# Patient Record
Sex: MALE | Race: WHITE | NOT HISPANIC OR LATINO
[De-identification: names, ages, dates, MRNs, and addresses within clinical notes are randomized per-mention and may not be internally consistent; named-entity substitution may affect disease eponyms.]

---

## 2020-05-28 ENCOUNTER — RECORDS - HEALTHEAST (OUTPATIENT)
Dept: ADMINISTRATIVE | Facility: OTHER | Age: 60
End: 2020-05-28

## 2023-02-01 ENCOUNTER — LAB REQUISITION (OUTPATIENT)
Dept: LAB | Facility: HOSPITAL | Age: 63
End: 2023-02-01

## 2023-02-01 PROCEDURE — 88342 IMHCHEM/IMCYTCHM 1ST ANTB: CPT | Mod: TC | Performed by: PATHOLOGY

## 2025-06-16 ENCOUNTER — TRANSCRIBE ORDERS (OUTPATIENT)
Dept: OTHER | Age: 65
End: 2025-06-16

## 2025-06-16 DIAGNOSIS — M54.17 LUMBOSACRAL RADICULOPATHY: Primary | ICD-10-CM

## 2025-06-25 NOTE — TELEPHONE ENCOUNTER
Action    Action Taken Request sent to [facility]ul for imaging      SPINE PATIENTS - NEW PROTOCOL PREVISIT     RECORDS RECEVEIVED FROM: Referred by Marcella Jacobson; direct referral   REASON FOR VISIT: Lumbosacral radiculopathy; Nerve root compression in the setting of bony mets   PROVIDER: Noah   DATE OF APPT: 07/11/2025     NOTES (FOR ALL VISITS) STATUS DETAILS   OFFICE NOTE from referring provider  Referral in chart, notes in via care everywhere   OFFICE NOTE from other specialist     DISCHARGE SUMMARY from hospital Care everywhere 06/13/2025 ED visit   DISCHARGE REPORT from ER     OPERATIVE REPORT     EMG REPORT     Injection     Physical therapy       IMAGING  (FOR ALL VISITS) STATUS DETAILS   MRI (HEAD, NECK, SPINE) received MRI lumbar 06/12/2025 w/[facility]   XRAY (SPINE) *NEUROSURGERY*     CT (HEAD, NECK, SPINE) received CT abd pelvis 02/06/2025        Does patient have C2C?  Year last updated Action     YES   []      Please update at appointment if outdated more than 5 years       NO     [x]   N/A   Please complete C2C at appointment

## 2025-07-11 ENCOUNTER — PRE VISIT (OUTPATIENT)
Dept: NEUROSURGERY | Facility: CLINIC | Age: 65
End: 2025-07-11

## 2025-07-11 ENCOUNTER — OFFICE VISIT (OUTPATIENT)
Dept: NEUROSURGERY | Facility: CLINIC | Age: 65
End: 2025-07-11
Payer: COMMERCIAL

## 2025-07-11 VITALS
HEIGHT: 68 IN | SYSTOLIC BLOOD PRESSURE: 101 MMHG | OXYGEN SATURATION: 97 % | BODY MASS INDEX: 34.1 KG/M2 | WEIGHT: 225 LBS | HEART RATE: 72 BPM | DIASTOLIC BLOOD PRESSURE: 66 MMHG

## 2025-07-11 DIAGNOSIS — C79.51 METASTASIS TO SPINAL COLUMN (H): Primary | ICD-10-CM

## 2025-07-11 DIAGNOSIS — M54.17 LUMBOSACRAL RADICULOPATHY: ICD-10-CM

## 2025-07-11 DIAGNOSIS — C61 PROSTATE CANCER METASTATIC TO MULTIPLE SITES (H): ICD-10-CM

## 2025-07-11 PROCEDURE — 1125F AMNT PAIN NOTED PAIN PRSNT: CPT | Performed by: NEUROLOGICAL SURGERY

## 2025-07-11 PROCEDURE — 3078F DIAST BP <80 MM HG: CPT | Performed by: NEUROLOGICAL SURGERY

## 2025-07-11 PROCEDURE — 3074F SYST BP LT 130 MM HG: CPT | Performed by: NEUROLOGICAL SURGERY

## 2025-07-11 PROCEDURE — 99204 OFFICE O/P NEW MOD 45 MIN: CPT | Performed by: NEUROLOGICAL SURGERY

## 2025-07-11 RX ORDER — ABIRATERONE ACETATE 250 MG/1
1000 TABLET ORAL
COMMUNITY
Start: 2025-02-14

## 2025-07-11 RX ORDER — METOPROLOL SUCCINATE 100 MG/1
12.5 TABLET, EXTENDED RELEASE ORAL DAILY
COMMUNITY

## 2025-07-11 RX ORDER — METOPROLOL SUCCINATE 25 MG/1
12.5 TABLET, EXTENDED RELEASE ORAL
COMMUNITY
Start: 2025-07-09

## 2025-07-11 RX ORDER — SEMAGLUTIDE 0.68 MG/ML
INJECTION, SOLUTION SUBCUTANEOUS
COMMUNITY
Start: 2025-06-23

## 2025-07-11 RX ORDER — ATORVASTATIN CALCIUM 10 MG/1
TABLET, FILM COATED ORAL DAILY
COMMUNITY

## 2025-07-11 RX ORDER — INSULIN LISPRO 100 [IU]/ML
INJECTION, SOLUTION INTRAVENOUS; SUBCUTANEOUS
COMMUNITY
Start: 2025-02-19

## 2025-07-11 RX ORDER — TICAGRELOR 90 MG/1
90 TABLET, FILM COATED ORAL
COMMUNITY
Start: 2025-07-09

## 2025-07-11 RX ORDER — AMPICILLIN TRIHYDRATE 500 MG
1000 CAPSULE ORAL
COMMUNITY
Start: 2023-12-18

## 2025-07-11 RX ORDER — ONDANSETRON 8 MG/1
8 TABLET, FILM COATED ORAL
COMMUNITY
Start: 2025-02-18

## 2025-07-11 RX ORDER — ESCITALOPRAM OXALATE 10 MG/1
10 TABLET ORAL DAILY
COMMUNITY
Start: 2025-05-28

## 2025-07-11 RX ORDER — ASPIRIN 81 MG/1
81 TABLET, CHEWABLE ORAL
COMMUNITY

## 2025-07-11 RX ORDER — CYCLOBENZAPRINE HCL 10 MG
10 TABLET ORAL
COMMUNITY
Start: 2025-06-03

## 2025-07-11 RX ORDER — CLOPIDOGREL BISULFATE 75 MG/1
75 TABLET ORAL DAILY
COMMUNITY

## 2025-07-11 RX ORDER — LOSARTAN POTASSIUM 25 MG/1
12.5 TABLET ORAL DAILY
COMMUNITY
Start: 2025-07-09

## 2025-07-11 RX ORDER — ROSUVASTATIN CALCIUM 5 MG/1
5 TABLET, COATED ORAL DAILY
COMMUNITY
Start: 2025-02-03

## 2025-07-11 RX ORDER — PREDNISONE 5 MG/1
5 TABLET ORAL
COMMUNITY
Start: 2025-05-21

## 2025-07-11 ASSESSMENT — PAIN SCALES - GENERAL: PAINLEVEL_OUTOF10: MODERATE PAIN (6)

## 2025-07-11 NOTE — NURSING NOTE
"Sumanth Lang is a 64 year old male who presents for:  Chief Complaint   Patient presents with    Consult     Current pain 5/10  Pain range 5-9/10        Initial Vitals:  /66 (BP Location: Left arm, Patient Position: Sitting, Cuff Size: Adult Regular)   Pulse 72   Ht 5' 8\" (1.727 m)   Wt 225 lb (102.1 kg)   SpO2 97%   BMI 34.21 kg/m   Estimated body mass index is 34.21 kg/m  as calculated from the following:    Height as of this encounter: 5' 8\" (1.727 m).    Weight as of this encounter: 225 lb (102.1 kg).. Body surface area is 2.21 meters squared. BP completed using cuff size: regular  Moderate Pain (6)    Alejo Gardner    "

## 2025-07-11 NOTE — LETTER
7/11/2025      Sumanth Lang  [address] [place] [id number]      Dear Colleague,    Thank you for referring your patient, Sumanth Lang, to the [facility]. Please see a copy of my visit note below.    It was a pleasure to see Sumanth Lang today in Neurosurgery Clinic. He is a 64 year old male who is here for evaluation of his left L4 radiculopathy.    The patient has a history of castrate resistant prostate cancer and is currently on oral chemotherapy for his prostate cancer.  Beginning in June he noticed worsening pain in the back and left lower extremity radiating down the leg and what sounds like an L4 distribution with numbness and pain to the thigh and shin and front of the ankle.    He describes mild weakness but is able to walk and go up and down the stairs without difficulty.    He is retired.  He lives in [facility].   He is here today with his wife and daughter.    No past medical history on file.  No past surgical history on file.   Not on File    Current Outpatient Medications:      abiraterone (ZYTIGA) 250 MG tablet, Take 1,000 mg by mouth., Disp: , Rfl:      Cholecalciferol (D 1000) 25 MCG (1000 UT) CAPS, Take 1,000 Units by mouth., Disp: , Rfl:      cyclobenzaprine (FLEXERIL) 10 MG tablet, Take 10 mg by mouth., Disp: , Rfl:      empagliflozin (JARDIANCE) 25 MG TABS tablet, Take 25 mg by mouth daily., Disp: , Rfl:      escitalopram (LEXAPRO) 10 MG tablet, Take 10 mg by mouth daily., Disp: , Rfl:      insulin lispro (HUMALOG KWIKPEN) 100 UNIT/ML (1 unit dial) KWIKPEN, Inject per sliding scale 3 times daily at breakfast, lunch and dinner on days taking steroids. (Glucose 130-139 inject 2 units; 140-149 inject 4 units; 150-164 inject 6 units; 165-174 inject 8 units; 175-199 inject 10 units, > 200 inject 12 units), Disp: , Rfl:      losartan (COZAAR) 25 MG tablet, Take 12.5 mg by mouth daily., Disp: , Rfl:      metoprolol succinate ER (TOPROL XL) 25 MG 24 hr  tablet, Take 12.5 mg by mouth., Disp: , Rfl:      olaparib (LYNPARZA) 150 MG tablet, Take 300 mg by mouth 2 times daily, Disp: , Rfl:      ondansetron (ZOFRAN) 8 MG tablet, Take 8 mg by mouth., Disp: , Rfl:      predniSONE (DELTASONE) 5 MG tablet, Take 5 mg by mouth., Disp: , Rfl:      rosuvastatin (CRESTOR) 5 MG tablet, Take 5 mg by mouth daily., Disp: , Rfl:      semaglutide (OZEMPIC, 0.25 OR 0.5 MG/DOSE,) 2 MG/3ML pen, INJECT 0.75ML (0.5 MG) SUBCUTANEOUS ONCE WEEKLY, Disp: , Rfl:      ticagrelor (BRILINTA) 90 MG tablet, Take 90 mg by mouth., Disp: , Rfl:      aspirin (ASA) 81 MG chewable tablet, Take 81 mg by mouth., Disp: , Rfl:      atorvastatin (LIPITOR) 10 MG tablet, Take by mouth daily., Disp: , Rfl:      clopidogrel (PLAVIX) 75 MG tablet, Take 75 mg by mouth daily., Disp: , Rfl:      metoprolol succinate ER (TOPROL XL) 100 MG 24 hr tablet, Take 12.5 mg by mouth daily., Disp: , Rfl:   Social History     Socioeconomic History     Marital status:      Spouse name: None     Number of children: None     Years of education: None     Highest education level: None   Tobacco Use     Smoking status: Former     Types: Cigarettes     Smokeless tobacco: Never     Social Drivers of Health     Financial Resource Strain: Low Risk  (6/9/2025)    Received from [facility]    Financial Resource Strain      Difficulty of Paying Living Expenses: 3   Food Insecurity: No Food Insecurity (6/9/2025)    Received from [facility]    Food Insecurity      Do you worry your food will run out before you are able to buy more?: 1   Transportation Needs: No Transportation Needs (6/9/2025)    Received from [facility]    Transportation Needs      Does lack of transportation keep you from medical appointments?: 1      Does lack of transportation keep you from work, meetings or getting things that you need?: 1   Social Connections:  "Socially Integrated (6/9/2025)    Received from [facility]    Social Connections      Do you often feel lonely or isolated from those around you?: 0   Housing Stability: Low Risk  (6/9/2025)    Received from [facility]    Housing Stability      What is your housing situation today?: 1      No data available             ROS: 10 point ROS neg other than the symptoms noted above in the HPI.    Vitals:    07/11/25 1539   BP: 101/66   BP Location: Left arm   Patient Position: Sitting   Cuff Size: Adult Regular   Pulse: 72   SpO2: 97%   Weight: 102.1 kg (225 lb)   Height: 1.727 m (5' 8\")     Estimated body mass index is 34.21 kg/m  as calculated from the following:    Height as of this encounter: 1.727 m (5' 8\").    Weight as of this encounter: 102.1 kg (225 lb).  Moderate Pain (6)    Oswestry (JAVI) Questionnaire         No data to display                Visual Analog Scale (VAS) Questionnaire         No data to display                   Awake and alert  Bilateral lower extremity strength is 5 out of 5 in all muscle groups  Reflexes symmetric and normal except for 0 left patella  Decrease sensation to pinprick in the left L4 distribution  Posture erect without obvious deformity  Mild tenderness to palpation in the left paraspinal region.    Imaging: Review of his recent MRI shows bony involvement of L4 with epidural extension impinging on the left L4 nerve root.  Review of a bone scan from February shows new activity in L4 by report when compared with previous bone scans.    Assessment: L4 metastasis from metastatic prostate cancer with radiculopathy.    Plan: Given the active disease in this area and his symptoms, I think the patient would actually benefit most from radiotherapy to this area.  This would provide some degree of disease control at L4 as well as likely improve his symptomatology.  I have placed a referral to his local cancer center for " radiation oncology evaluation.  We also discussed that he may likely need a MRI with contrast for further evaluation prior to treatment but we will defer this to his primary oncologic providers in [facility].  He may follow-up with me on an as-needed basis.       Again, thank you for allowing me to participate in the care of your patient.        Sincerely,        Evan Zamora MD    Electronically signed

## 2025-07-11 NOTE — PATIENT INSTRUCTIONS
Patient Next Steps:    Radiation Oncology referral placed and faxed to [facility]  [facility] [phone]    Please call us if you have any further questions or concerns.    [facility]   Phone: [phone]  Fax: [phone]

## 2025-07-11 NOTE — PROGRESS NOTES
It was a pleasure to see Sumanth Lang today in Neurosurgery Clinic. He is a 64 year old male who is here for evaluation of his left L4 radiculopathy.    The patient has a history of castrate resistant prostate cancer and is currently on oral chemotherapy for his prostate cancer.  Beginning in June he noticed worsening pain in the back and left lower extremity radiating down the leg and what sounds like an L4 distribution with numbness and pain to the thigh and shin and front of the ankle.    He describes mild weakness but is able to walk and go up and down the stairs without difficulty.    He is retired.  He lives in [facility].   He is here today with his wife and daughter.    No past medical history on file.  No past surgical history on file.   Not on File    Current Outpatient Medications:     abiraterone (ZYTIGA) 250 MG tablet, Take 1,000 mg by mouth., Disp: , Rfl:     Cholecalciferol (D 1000) 25 MCG (1000 UT) CAPS, Take 1,000 Units by mouth., Disp: , Rfl:     cyclobenzaprine (FLEXERIL) 10 MG tablet, Take 10 mg by mouth., Disp: , Rfl:     empagliflozin (JARDIANCE) 25 MG TABS tablet, Take 25 mg by mouth daily., Disp: , Rfl:     escitalopram (LEXAPRO) 10 MG tablet, Take 10 mg by mouth daily., Disp: , Rfl:     insulin lispro (HUMALOG KWIKPEN) 100 UNIT/ML (1 unit dial) KWIKPEN, Inject per sliding scale 3 times daily at breakfast, lunch and dinner on days taking steroids. (Glucose 130-139 inject 2 units; 140-149 inject 4 units; 150-164 inject 6 units; 165-174 inject 8 units; 175-199 inject 10 units, > 200 inject 12 units), Disp: , Rfl:     losartan (COZAAR) 25 MG tablet, Take 12.5 mg by mouth daily., Disp: , Rfl:     metoprolol succinate ER (TOPROL XL) 25 MG 24 hr tablet, Take 12.5 mg by mouth., Disp: , Rfl:     olaparib (LYNPARZA) 150 MG tablet, Take 300 mg by mouth 2 times daily, Disp: , Rfl:     ondansetron (ZOFRAN) 8 MG tablet, Take 8 mg by mouth., Disp: , Rfl:     predniSONE (DELTASONE) 5 MG tablet, Take  5 mg by mouth., Disp: , Rfl:     rosuvastatin (CRESTOR) 5 MG tablet, Take 5 mg by mouth daily., Disp: , Rfl:     semaglutide (OZEMPIC, 0.25 OR 0.5 MG/DOSE,) 2 MG/3ML pen, INJECT 0.75ML (0.5 MG) SUBCUTANEOUS ONCE WEEKLY, Disp: , Rfl:     ticagrelor (BRILINTA) 90 MG tablet, Take 90 mg by mouth., Disp: , Rfl:     aspirin (ASA) 81 MG chewable tablet, Take 81 mg by mouth., Disp: , Rfl:     atorvastatin (LIPITOR) 10 MG tablet, Take by mouth daily., Disp: , Rfl:     clopidogrel (PLAVIX) 75 MG tablet, Take 75 mg by mouth daily., Disp: , Rfl:     metoprolol succinate ER (TOPROL XL) 100 MG 24 hr tablet, Take 12.5 mg by mouth daily., Disp: , Rfl:   Social History     Socioeconomic History    Marital status:      Spouse name: None    Number of children: None    Years of education: None    Highest education level: None   Tobacco Use    Smoking status: Former     Types: Cigarettes    Smokeless tobacco: Never     Social Drivers of Health     Financial Resource Strain: Low Risk  (6/9/2025)    [facility]    Financial Resource Strain     Difficulty of Paying Living Expenses: 3   Food Insecurity: No Food Insecurity (6/9/2025)    Received from [facility]    Food Insecurity     Do you worry your food will run out before you are able to buy more?: 1   Transportation Needs: No Transportation Needs (6/9/2025)    Received from [facility]    Transportation Needs     Does lack of transportation keep you from medical appointments?: 1     Does lack of transportation keep you from work, meetings or getting things that you need?: 1   Social Connections: Socially Integrated (6/9/2025)    Received from [facility]    Social Connections     Do you often feel lonely or isolated from those around you?: 0   Housing Stability: Low Risk  (6/9/2025)    Received from [facility]    Housing Stability     What is your housing situation today?: 1      No data available             ROS: 10 point ROS neg other than the symptoms noted above in the HPI.    Vitals:    07/11/25 1539   BP: 101/66   BP Location: Left arm   Patient Position: Sitting   Cuff Size: Adult Regular   Pulse: 72   SpO2: 97%   Weight: 102.1 kg (225 lb)   Height: 1.727 m (5' 8\")     Estimated body mass index is 34.21 kg/m  as calculated from the following:    Height as of this encounter: 1.727 m (5' 8\").    Weight as of this encounter: 102.1 kg (225 lb).  Moderate Pain (6)    Oswestry (JAVI) Questionnaire         No data to display                Visual Analog Scale (VAS) Questionnaire         No data to display                   Awake and alert  Bilateral lower extremity strength is 5 out of 5 in all muscle groups  Reflexes symmetric and normal except for 0 left patella  Decrease sensation to pinprick in the left L4 distribution  Posture erect without obvious deformity  Mild tenderness to palpation in the left paraspinal region.    Imaging: Review of his recent MRI shows bony involvement of L4 with epidural extension impinging on the left L4 nerve root.  Review of a bone scan from February shows new activity in L4 by report when compared with previous bone scans.    Assessment: L4 metastasis from metastatic prostate cancer with radiculopathy.    Plan: Given the active disease in this area and his symptoms, I think the patient would actually benefit most from radiotherapy to this area.  This would provide some degree of disease control at L4 as well as likely improve his symptomatology.  I have placed a referral to his local cancer center for radiation oncology evaluation.  We also discussed that he may likely need a MRI with contrast for further evaluation prior to treatment but we will defer this to his primary oncologic providers in [facility].  He may follow-up with me on an as-needed basis.     "

## 2025-07-14 NOTE — PROGRESS NOTES
Faxed ONC referral to [facility] at fax number [phone] on 7/14/25    Kamilla confirmed @ 8:42am 7/14/25

## 2025-07-17 ENCOUNTER — DOCUMENTATION ONLY (OUTPATIENT)
Dept: NEUROSURGERY | Facility: CLINIC | Age: 65
End: 2025-07-17
Payer: COMMERCIAL

## 2025-07-17 NOTE — PROGRESS NOTES
Faxed Adult Oncology/Hematology Referral Form July 17, 2025 to [facility] fax number [phone]    Right Fax confirmed at 10:20 AM    Alejo Gardner

## 2025-07-23 ENCOUNTER — TRANSFERRED RECORDS (OUTPATIENT)
Dept: HEALTH INFORMATION MANAGEMENT | Facility: CLINIC | Age: 65
End: 2025-07-23

## 2025-07-28 ENCOUNTER — TRANSFERRED RECORDS (OUTPATIENT)
Dept: HEALTH INFORMATION MANAGEMENT | Facility: CLINIC | Age: 65
End: 2025-07-28

## 2025-08-04 ENCOUNTER — TRANSFERRED RECORDS (OUTPATIENT)
Dept: HEALTH INFORMATION MANAGEMENT | Facility: CLINIC | Age: 65
End: 2025-08-04